# Patient Record
Sex: FEMALE | URBAN - METROPOLITAN AREA
[De-identification: names, ages, dates, MRNs, and addresses within clinical notes are randomized per-mention and may not be internally consistent; named-entity substitution may affect disease eponyms.]

---

## 2024-03-19 ENCOUNTER — OV NP (OUTPATIENT)
Dept: URBAN - METROPOLITAN AREA CLINIC 25 | Facility: CLINIC | Age: 54
End: 2024-03-19
Payer: COMMERCIAL

## 2024-03-19 VITALS
DIASTOLIC BLOOD PRESSURE: 80 MMHG | WEIGHT: 144 LBS | BODY MASS INDEX: 20.62 KG/M2 | SYSTOLIC BLOOD PRESSURE: 116 MMHG | TEMPERATURE: 97.1 F | HEIGHT: 70 IN | HEART RATE: 78 BPM

## 2024-03-19 DIAGNOSIS — Z87.19 HISTORY OF FOOD INTOLERANCE: ICD-10-CM

## 2024-03-19 DIAGNOSIS — R10.11 RUQ ABDOMINAL PAIN: ICD-10-CM

## 2024-03-19 PROCEDURE — 99204 OFFICE O/P NEW MOD 45 MIN: CPT | Performed by: INTERNAL MEDICINE

## 2024-03-19 RX ORDER — DICYCLOMINE HYDROCHLORIDE 10 MG/1
1 CAPSULE 30 MINUTES BEFORE EATING CAPSULE ORAL THREE TIMES A DAY
Qty: 90 | Refills: 1 | OUTPATIENT
Start: 2024-03-19 | End: 2024-05-18

## 2024-03-19 NOTE — HPI-TODAY'S VISIT:
55yo pt presents with intermittent ruq abd for 10yrs and lasts for a day or weeks with unremarkable U/S and labs including amylase/lipase and ?MRI abd. Last 2 weeks sxs has been more frequent with newly developed sxs. Denies f/c/n/v/wt loss/diarrhea/change in BM. She has 1 soft BM/day.

## 2024-05-07 ENCOUNTER — DASHBOARD ENCOUNTERS (OUTPATIENT)
Age: 54
End: 2024-05-07

## 2024-05-16 ENCOUNTER — OFFICE VISIT (OUTPATIENT)
Dept: URBAN - METROPOLITAN AREA CLINIC 25 | Facility: CLINIC | Age: 54
End: 2024-05-16

## 2024-05-16 RX ORDER — DICYCLOMINE HYDROCHLORIDE 10 MG/1
1 CAPSULE 30 MINUTES BEFORE EATING CAPSULE ORAL THREE TIMES A DAY
Qty: 90 | Refills: 1 | COMMUNITY
Start: 2024-03-19 | End: 2024-05-18